# Patient Record
Sex: FEMALE | URBAN - METROPOLITAN AREA
[De-identification: names, ages, dates, MRNs, and addresses within clinical notes are randomized per-mention and may not be internally consistent; named-entity substitution may affect disease eponyms.]

---

## 2021-04-22 ENCOUNTER — APPOINTMENT (OUTPATIENT)
Age: 31
Setting detail: DERMATOLOGY
End: 2021-08-23

## 2021-04-22 DIAGNOSIS — Z41.9 ENCOUNTER FOR PROCEDURE FOR PURPOSES OTHER THAN REMEDYING HEALTH STATE, UNSPECIFIED: ICD-10-CM

## 2021-04-22 PROCEDURE — OTHER ADDITIONAL NOTES: OTHER

## 2021-04-22 NOTE — PROCEDURE: ADDITIONAL NOTES
Additional Notes: Discussed neurotoxins and past botox treatment at another facility. Educated on expectations/ outcomes/dosing.
Render Risk Assessment In Note?: no
Detail Level: Simple

## 2021-05-18 ENCOUNTER — APPOINTMENT (OUTPATIENT)
Age: 31
Setting detail: DERMATOLOGY
End: 2021-08-23

## 2021-05-18 VITALS — TEMPERATURE: 98.2 F

## 2021-05-18 DIAGNOSIS — L98.8 OTHER SPECIFIED DISORDERS OF THE SKIN AND SUBCUTANEOUS TISSUE: ICD-10-CM

## 2021-05-18 PROCEDURE — OTHER ADDITIONAL NOTES: OTHER

## 2021-05-18 NOTE — PROCEDURE: ADDITIONAL NOTES
Additional Notes: [employer] 32 units. Lot number [id number] exp 11/23 Additional Notes: [employer] 32 units. Lot number [id number] exp 11/23

## 2021-06-03 ENCOUNTER — APPOINTMENT (OUTPATIENT)
Dept: URBAN - METROPOLITAN AREA CLINIC 265 | Age: 31
Setting detail: DERMATOLOGY
End: 2021-08-23

## 2021-06-03 DIAGNOSIS — Z41.9 ENCOUNTER FOR PROCEDURE FOR PURPOSES OTHER THAN REMEDYING HEALTH STATE, UNSPECIFIED: ICD-10-CM

## 2021-06-03 PROCEDURE — OTHER ADDITIONAL NOTES: OTHER

## 2021-06-03 NOTE — PROCEDURE: ADDITIONAL NOTES
Detail Level: Simple
Additional Notes: Good outcome from injections, pt pleased.
Render Risk Assessment In Note?: no

## 2021-08-20 ENCOUNTER — APPOINTMENT (OUTPATIENT)
Dept: URBAN - METROPOLITAN AREA CLINIC 266 | Age: 31
Setting detail: DERMATOLOGY
End: 2021-08-20

## 2021-08-20 ENCOUNTER — APPOINTMENT (OUTPATIENT)
Dept: URBAN - METROPOLITAN AREA CLINIC 266 | Age: 31
Setting detail: DERMATOLOGY
End: 2021-08-23

## 2021-08-20 DIAGNOSIS — L98.8 OTHER SPECIFIED DISORDERS OF THE SKIN AND SUBCUTANEOUS TISSUE: ICD-10-CM

## 2021-08-20 PROCEDURE — OTHER ADDITIONAL NOTES: OTHER

## 2021-08-20 NOTE — PROCEDURE: ADDITIONAL NOTES
Additional Notes: [employer] 32 unit. Lot [id number] exp 2/24 Additional Notes: [employer] 32 unit. Lot [id number] exp 2/24

## 2021-12-03 ENCOUNTER — APPOINTMENT (OUTPATIENT)
Dept: URBAN - METROPOLITAN AREA CLINIC 265 | Age: 31
Setting detail: DERMATOLOGY
End: 2022-01-04

## 2021-12-03 DIAGNOSIS — L98.8 OTHER SPECIFIED DISORDERS OF THE SKIN AND SUBCUTANEOUS TISSUE: ICD-10-CM

## 2021-12-03 PROCEDURE — OTHER ADDITIONAL NOTES: OTHER

## 2021-12-03 NOTE — PROCEDURE: ADDITIONAL NOTES
Additional Notes: [employer] Lot # [id number] EXP: 03/2024 32 units Additional Notes: [employer] Lot # [id number] EXP: 03/2024 32 units

## 2022-03-16 ENCOUNTER — APPOINTMENT (OUTPATIENT)
Dept: URBAN - METROPOLITAN AREA CLINIC 265 | Age: 32
Setting detail: DERMATOLOGY
End: 2022-07-11

## 2022-03-16 DIAGNOSIS — L98.8 OTHER SPECIFIED DISORDERS OF THE SKIN AND SUBCUTANEOUS TISSUE: ICD-10-CM

## 2022-03-16 PROCEDURE — OTHER ADDITIONAL NOTES: OTHER

## 2022-03-16 NOTE — PROCEDURE: ADDITIONAL NOTES
Additional Notes: [employer]oret[employer] 46 units. Lot [id number] exp 2024 -08. Additional Notes: [employer]oret[employer] 46 units. Lot [id number] exp 2024 -08.

## 2022-06-22 ENCOUNTER — APPOINTMENT (OUTPATIENT)
Dept: URBAN - METROPOLITAN AREA CLINIC 265 | Age: 32
Setting detail: DERMATOLOGY
End: 2022-07-11

## 2022-06-22 DIAGNOSIS — L98.8 OTHER SPECIFIED DISORDERS OF THE SKIN AND SUBCUTANEOUS TISSUE: ICD-10-CM

## 2022-06-22 PROCEDURE — OTHER ADDITIONAL NOTES: OTHER

## 2022-06-22 NOTE — PROCEDURE: ADDITIONAL NOTES
Additional Notes: [employer] 46 units lot number [id number]. Exp 10/2024 Additional Notes: [employer] 46 units lot number [id number]. Exp 10/2024

## 2022-11-09 ENCOUNTER — APPOINTMENT (OUTPATIENT)
Dept: URBAN - METROPOLITAN AREA CLINIC 265 | Age: 32
Setting detail: DERMATOLOGY
End: 2022-11-09

## 2022-11-09 DIAGNOSIS — L98.8 OTHER SPECIFIED DISORDERS OF THE SKIN AND SUBCUTANEOUS TISSUE: ICD-10-CM

## 2022-11-09 PROCEDURE — OTHER ADDITIONAL NOTES: OTHER

## 2022-11-09 NOTE — PROCEDURE: ADDITIONAL NOTES
Render Risk Assessment In Note?: no
Detail Level: Simple
Additional Notes: [employer] 40 units lot [id number] exp 02/2025

## 2023-07-07 ENCOUNTER — APPOINTMENT (OUTPATIENT)
Dept: URBAN - METROPOLITAN AREA CLINIC 298 | Age: 33
Setting detail: DERMATOLOGY
End: 2023-07-24

## 2023-07-07 DIAGNOSIS — L98.8 OTHER SPECIFIED DISORDERS OF THE SKIN AND SUBCUTANEOUS TISSUE: ICD-10-CM

## 2023-07-07 PROCEDURE — OTHER JEUVEAU: OTHER

## 2023-07-07 NOTE — PROCEDURE: JEUVEAU
Additional Area 1 Location: chin
Glabellar Complex Units: 20
Show Forehead Units: Yes
Periorbital Skin Units: 0
Show Right And Left Periorbital Units: No
Consent: Written consent obtained. Risks include but not limited to lid/brow ptosis, bruising, swelling, diplopia, temporary effect, incomplete chemical denervation.
Dilution (U/0.1 Cc): 4
Detail Level: Detailed
Forehead Units: 10
Post-Care Instructions: Patient instructed to not lie down for 4 hours and limit physical activity for 24 hours.
Show Inventory Tab: Show

## 2023-11-07 ENCOUNTER — APPOINTMENT (OUTPATIENT)
Dept: URBAN - METROPOLITAN AREA CLINIC 298 | Age: 33
Setting detail: DERMATOLOGY
End: 2023-11-10

## 2023-11-07 DIAGNOSIS — L98.8 OTHER SPECIFIED DISORDERS OF THE SKIN AND SUBCUTANEOUS TISSUE: ICD-10-CM

## 2023-11-07 PROCEDURE — OTHER JEUVEAU: OTHER

## 2023-11-07 NOTE — PROCEDURE: JEUVEAU
Show Additional Area 3: Yes
Show Right And Left Pupillary Line Units: No
Glabellar Complex Units: 20
Inferior Lateral Orbicularis Oculi Units: 0
Additional Area 2 Location: chin
Detail Level: Detailed
Forehead Units: 10
Consent: Written consent obtained. Risks include but not limited to lid/brow ptosis, bruising, swelling, diplopia, temporary effect, incomplete chemical denervation.
Dilution (U/0.1 Cc): 1
Show Inventory Tab: Show
Post-Care Instructions: Patient instructed to not lie down for 4 hours and limit physical activity for 24 hours.
Additional Area 1 Location: under eyes

## 2024-03-22 ENCOUNTER — APPOINTMENT (OUTPATIENT)
Dept: URBAN - METROPOLITAN AREA CLINIC 298 | Age: 34
Setting detail: DERMATOLOGY
End: 2024-03-22

## 2024-03-22 DIAGNOSIS — Z41.9 ENCOUNTER FOR PROCEDURE FOR PURPOSES OTHER THAN REMEDYING HEALTH STATE, UNSPECIFIED: ICD-10-CM

## 2024-03-22 PROCEDURE — OTHER FACIAL: OTHER

## 2024-03-22 ASSESSMENT — LOCATION DETAILED DESCRIPTION DERM
LOCATION DETAILED: RIGHT MENTAL CREASE
LOCATION DETAILED: RIGHT MEDIAL FOREHEAD
LOCATION DETAILED: LEFT INFERIOR MEDIAL MALAR CHEEK
LOCATION DETAILED: RIGHT INFERIOR CENTRAL MALAR CHEEK

## 2024-03-22 ASSESSMENT — LOCATION SIMPLE DESCRIPTION DERM
LOCATION SIMPLE: CHIN
LOCATION SIMPLE: RIGHT CHEEK
LOCATION SIMPLE: LEFT CHEEK
LOCATION SIMPLE: RIGHT FOREHEAD

## 2024-03-22 ASSESSMENT — LOCATION ZONE DERM: LOCATION ZONE: FACE

## 2024-03-22 NOTE — PROCEDURE: FACIAL
Price (Use Numbers Only, No Special Characters Or $): 100
Detail Level: Zone
Comments (Sticky): Signature facial
Exfoliation Type: enzyme
